# Patient Record
Sex: MALE | Race: WHITE | Employment: FULL TIME | URBAN - METROPOLITAN AREA
[De-identification: names, ages, dates, MRNs, and addresses within clinical notes are randomized per-mention and may not be internally consistent; named-entity substitution may affect disease eponyms.]

---

## 2020-03-14 ENCOUNTER — HOSPITAL ENCOUNTER (EMERGENCY)
Facility: CLINIC | Age: 30
Discharge: HOME | End: 2020-03-14
Attending: NURSE PRACTITIONER | Admitting: NURSE PRACTITIONER

## 2020-03-14 ENCOUNTER — APPOINTMENT (OUTPATIENT)
Dept: GENERAL RADIOLOGY | Facility: CLINIC | Age: 30
End: 2020-03-14
Attending: PHYSICIAN ASSISTANT

## 2020-03-14 VITALS
TEMPERATURE: 97.3 F | WEIGHT: 158 LBS | HEART RATE: 63 BPM | BODY MASS INDEX: 22.62 KG/M2 | DIASTOLIC BLOOD PRESSURE: 51 MMHG | OXYGEN SATURATION: 99 % | HEIGHT: 70 IN | RESPIRATION RATE: 16 BRPM | SYSTOLIC BLOOD PRESSURE: 95 MMHG

## 2020-03-14 DIAGNOSIS — S60.221A CONTUSION OF RIGHT HAND, INITIAL ENCOUNTER: ICD-10-CM

## 2020-03-14 DIAGNOSIS — S69.91XA HAND INJURY, RIGHT, INITIAL ENCOUNTER: ICD-10-CM

## 2020-03-14 PROCEDURE — 99214 OFFICE O/P EST MOD 30 MIN: CPT | Mod: Z6 | Performed by: NURSE PRACTITIONER

## 2020-03-14 PROCEDURE — G0463 HOSPITAL OUTPT CLINIC VISIT: HCPCS | Performed by: NURSE PRACTITIONER

## 2020-03-14 PROCEDURE — 73130 X-RAY EXAM OF HAND: CPT | Mod: RT

## 2020-03-14 RX ORDER — NAPROXEN 500 MG/1
500 TABLET ORAL 2 TIMES DAILY WITH MEALS
Qty: 24 TABLET | Refills: 0 | Status: SHIPPED | OUTPATIENT
Start: 2020-03-14

## 2020-03-14 ASSESSMENT — ENCOUNTER SYMPTOMS
MYALGIAS: 1
WOUND: 0
DIZZINESS: 0
WEAKNESS: 0
JOINT SWELLING: 1
HEADACHES: 0
ARTHRALGIAS: 1
NUMBNESS: 0
LIGHT-HEADEDNESS: 0

## 2020-03-14 ASSESSMENT — MIFFLIN-ST. JEOR: SCORE: 1692.93

## 2020-03-14 NOTE — ED AVS SNAPSHOT
[facility] Emergency Department  [address] [address]  Phone:  [phone]  Fax:  [phone]                                    Kee Cabral   MRN: [id number]    Department:  [facility] Emergency Department   Date of Visit:  3/14/2020           After Visit Summary Signature Page    I have received my discharge instructions, and my questions have been answered. I have discussed any challenges I see with this plan with the nurse or doctor.    ..........................................................................................................................................  Patient/Patient Representative Signature      ..........................................................................................................................................  Patient Representative Print Name and Relationship to Patient    ..................................................               ................................................  Date                                   Time    ..........................................................................................................................................  Reviewed by Signature/Title    ...................................................              ..............................................  Date                                               Time          22EPIC Rev 08/18

## 2020-03-14 NOTE — ED TRIAGE NOTES
Right hand injury DOI 3/13/20. Middle knuckle. No numbness or tingling in fingers.  Janet Bruno LPN on 3/14/2020 at 12:52 PM

## 2020-03-14 NOTE — ED PROVIDER NOTES
"  History     Chief Complaint   Patient presents with     Hand Injury     HPI  Kee Cabral is a 29 year old male who presents urgent care for evaluation of right hand pain after he punched a piece of wood yesterday morning.  Pain is located to the right third MCP joint. Denies numbness and tingling.  Patient states he took 500 mg of naproxen which helped the pain.  No other associated injuries or complaints.    Allergies:  No Known Allergies    Problem List:    There are no active problems to display for this patient.       Past Medical History:    History reviewed. No pertinent past medical history.    Past Surgical History:    History reviewed. No pertinent surgical history.    Family History:    History reviewed. No pertinent family history.    Social History:  Marital Status:  Single [1]  Social History     Tobacco Use     Smoking status: Never Smoker     Smokeless tobacco: Never Used   Substance Use Topics     Alcohol use: None     Drug use: None        Medications:    naproxen (NAPROSYN) 500 MG tablet  NO ACTIVE MEDICATIONS          Review of Systems   Musculoskeletal: Positive for arthralgias, joint swelling and myalgias.   Skin: Negative for wound.   Neurological: Negative for dizziness, weakness, light-headedness, numbness and headaches.   All other systems reviewed and are negative.      Physical Exam   BP: 95/51  Pulse: 63  Temp: 97.3  F (36.3  C)  Resp: 16  Height: 177.8 cm (5' 10\")  Weight: 71.7 kg (158 lb)  SpO2: 99 %      Physical Exam  Constitutional:       Appearance: Normal appearance. He is not ill-appearing.   Musculoskeletal:      Right wrist: Normal.        Hands:    Skin:     General: Skin is warm.      Capillary Refill: Capillary refill takes less than 2 seconds.   Neurological:      Mental Status: He is alert.         ED Course        Procedures      Results for orders placed or performed during the hospital encounter of 03/14/20 (from the past 24 hour(s))   XR Hand Right G/E 3 Views    " Narrative    EXAM: XR HAND RT G/E 3 VW  LOCATION: [facility]  DATE/TIME: 3/14/2020 12:26 PM    INDICATION: Hand injury yesterday  COMPARISON: None.      Impression    IMPRESSION: Normal joint spaces and alignment. No fracture.       Medications - No data to display    Assessments & Plan (with Medical Decision Making)   Patient is a 29-year-old male who presents urgent care for evaluation of right third MCP joint pain after punching a piece of wood 24 hours ago.  Exam as above.  X-ray obtained with no acute fractures or malalignment.  Educated on soft tissue injury and management at home.  Printed prescription for naproxen.  Return precautions reviewed, all questions answered.  Patient discharged in stable condition.  I have reviewed the nursing notes.    I have reviewed the findings, diagnosis, plan and need for follow up with the patient.    Discharge Medication List as of 3/14/2020  1:43 PM      START taking these medications    Details   naproxen (NAPROSYN) 500 MG tablet Take 1 tablet (500 mg) by mouth 2 times daily (with meals), Disp-24 tablet,R-0, E-Prescribe             Final diagnoses:   Hand injury, right, initial encounter   Contusion of right hand, initial encounter       3/14/2020   [facility] EMERGENCY DEPARTMENT     Vanita Lazo, ARTIE CNP  03/14/20 1521

## 2021-08-31 ENCOUNTER — HOSPITAL ENCOUNTER (EMERGENCY)
Facility: CLINIC | Age: 31
Discharge: LEFT BEFORE DISCHARGE | End: 2021-08-31

## 2021-08-31 VITALS
SYSTOLIC BLOOD PRESSURE: 102 MMHG | RESPIRATION RATE: 14 BRPM | TEMPERATURE: 97 F | OXYGEN SATURATION: 98 % | HEART RATE: 52 BPM | DIASTOLIC BLOOD PRESSURE: 64 MMHG

## 2021-08-31 NOTE — ED TRIAGE NOTES
Work comp eye injury, concrete in eye, rinsed best could, feels scratched, no contacts in, no visual disturbance